# Patient Record
Sex: FEMALE | Race: WHITE
[De-identification: names, ages, dates, MRNs, and addresses within clinical notes are randomized per-mention and may not be internally consistent; named-entity substitution may affect disease eponyms.]

---

## 2021-10-19 ENCOUNTER — HOSPITAL ENCOUNTER (EMERGENCY)
Dept: HOSPITAL 47 - EC | Age: 21
Discharge: HOME | End: 2021-10-19
Payer: COMMERCIAL

## 2021-10-19 VITALS
DIASTOLIC BLOOD PRESSURE: 62 MMHG | TEMPERATURE: 97.7 F | HEART RATE: 64 BPM | SYSTOLIC BLOOD PRESSURE: 107 MMHG | RESPIRATION RATE: 18 BRPM

## 2021-10-19 DIAGNOSIS — Z91.040: ICD-10-CM

## 2021-10-19 DIAGNOSIS — K62.5: Primary | ICD-10-CM

## 2021-10-19 DIAGNOSIS — E11.9: ICD-10-CM

## 2021-10-19 DIAGNOSIS — F17.290: ICD-10-CM

## 2021-10-19 DIAGNOSIS — I10: ICD-10-CM

## 2021-10-19 LAB
BASOPHILS # BLD AUTO: 0 K/UL (ref 0–0.2)
BASOPHILS NFR BLD AUTO: 0 %
EOSINOPHIL # BLD AUTO: 0.2 K/UL (ref 0–0.7)
EOSINOPHIL NFR BLD AUTO: 2 %
ERYTHROCYTE [DISTWIDTH] IN BLOOD BY AUTOMATED COUNT: 5.09 M/UL (ref 3.8–5.4)
ERYTHROCYTE [DISTWIDTH] IN BLOOD: 15.7 % (ref 11.5–15.5)
HCT VFR BLD AUTO: 43.6 % (ref 34–46)
HGB BLD-MCNC: 13.6 GM/DL (ref 11.4–16)
LYMPHOCYTES # SPEC AUTO: 3.4 K/UL (ref 1–4.8)
LYMPHOCYTES NFR SPEC AUTO: 28 %
MCH RBC QN AUTO: 26.7 PG (ref 25–35)
MCHC RBC AUTO-ENTMCNC: 31.2 G/DL (ref 31–37)
MCV RBC AUTO: 85.6 FL (ref 80–100)
MONOCYTES # BLD AUTO: 0.6 K/UL (ref 0–1)
MONOCYTES NFR BLD AUTO: 5 %
NEUTROPHILS # BLD AUTO: 7.4 K/UL (ref 1.3–7.7)
NEUTROPHILS NFR BLD AUTO: 63 %
PLATELET # BLD AUTO: 320 K/UL (ref 150–450)
WBC # BLD AUTO: 11.9 K/UL (ref 3.8–10.6)

## 2021-10-19 PROCEDURE — 85025 COMPLETE CBC W/AUTO DIFF WBC: CPT

## 2021-10-19 PROCEDURE — 36415 COLL VENOUS BLD VENIPUNCTURE: CPT

## 2021-10-19 PROCEDURE — 99283 EMERGENCY DEPT VISIT LOW MDM: CPT

## 2021-10-19 NOTE — ED
General Adult HPI





- General


Chief complaint: GI Bleed


Stated complaint: blood in stool


Time Seen by Provider: 10/19/21 00:34


Source: patient


Mode of arrival: ambulatory





- History of Present Illness


Initial comments: 





21-year-old female with a past medical history of diabetes mellitus, 

hypertension, anemia presents to the emergency room for a chief complaint of 

rectal bleeding.  Patient states she was having vaginal and anal intercourse.  

Right afterwards she had a bowel movement and noticed there was bright red blood

in her bowel movement.  This concerned her as she has a history of anemia.  

Patient states she felt weaker and thought it could be because she was 

anemic.Patient has no other complaints at this time including shortness of 

breath, chest pain, abdominal pain, nausea or vomiting, headache, or visual 

changes.





- Related Data


                                    Allergies











Allergy/AdvReac Type Severity Reaction Status Date / Time


 


latex AdvReac  Rash/Hives Verified 10/19/21 00:31














Review of Systems


ROS Statement: 


Those systems with pertinent positive or pertinent negative responses have been 

documented in the HPI.





ROS Other: All systems not noted in ROS Statement are negative.





Past Medical History


Past Medical History: Diabetes Mellitus, Hypertension


Additional Past Medical History / Comment(s): anemia


History of Any Multi-Drug Resistant Organisms: None Reported


Additional Past Surgical History / Comment(s): thoracic/lumbar surgery


Past Psychological History: ADD/ADHD, Anxiety, Bipolar, Depression


Smoking Status: Current every day smoker, Vaper


Past Alcohol Use History: Occasional


Past Drug Use History: Marijuana





General Exam


General appearance: alert, in no apparent distress


Head exam: Present: atraumatic


Eye exam: Present: normal appearance, PERRL, EOMI.  Absent: scleral icterus, 

conjunctival injection


ENT exam: Present: normal exam, mucous membranes moist


Neck exam: Present: normal inspection, full ROM.  Absent: tenderness


Respiratory exam: Present: normal lung sounds bilaterally.  Absent: respiratory 

distress, wheezes


Cardiovascular Exam: Present: regular rate, normal rhythm, normal heart sounds


GI/Abdominal exam: Present: soft.  Absent: distended, tenderness


Rectal exam: Present: normal inspection (No bleeding noted.  Mellissa CRUZ present as

chaperone)





Course


                                   Vital Signs











  10/19/21





  00:23


 


Temperature 97.7 F


 


Pulse Rate 64


 


Respiratory 18





Rate 


 


Blood Pressure 107/62


 


O2 Sat by Pulse 98





Oximetry 














Medical Decision Making





- Medical Decision Making





Vitals are stable.  Patient is well-appearing.  HPI and physical exam as 

documented.  Patient very concerned she is anemic.  CBC was obtained which 

revealed a hemoglobin of 13.6.  At this time patient is not having active 

bleeding intermediate discharged home.





- Lab Data


Result diagrams: 


                                 10/19/21 01:15





                                   Lab Results











  10/19/21 Range/Units





  01:15 


 


WBC  11.9 H  (3.8-10.6)  k/uL


 


RBC  5.09  (3.80-5.40)  m/uL


 


Hgb  13.6  (11.4-16.0)  gm/dL


 


Hct  43.6  (34.0-46.0)  %


 


MCV  85.6  (80.0-100.0)  fL


 


MCH  26.7  (25.0-35.0)  pg


 


MCHC  31.2  (31.0-37.0)  g/dL


 


RDW  15.7 H  (11.5-15.5)  %


 


Plt Count  320  (150-450)  k/uL


 


MPV  7.3  


 


Neutrophils %  63  %


 


Lymphocytes %  28  %


 


Monocytes %  5  %


 


Eosinophils %  2  %


 


Basophils %  0  %


 


Neutrophils #  7.4  (1.3-7.7)  k/uL


 


Lymphocytes #  3.4  (1.0-4.8)  k/uL


 


Monocytes #  0.6  (0-1.0)  k/uL


 


Eosinophils #  0.2  (0-0.7)  k/uL


 


Basophils #  0.0  (0-0.2)  k/uL














Disposition


Clinical Impression: 


 Rectal bleeding





Disposition: HOME SELF-CARE


Condition: Good


Instructions (If sedation given, give patient instructions):  Gastrointestinal 

Bleeding (ED)


Additional Instructions: 


please follow up with your doctor in one to 2 days.  Return to the emergency 

room for any worsening symptoms.


Is patient prescribed a controlled substance at d/c from ED?: No


Referrals: 


Dami Figueroa MD [Primary Care Provider] - 1-2 days


Time of Disposition: 01:35

## 2022-02-10 NOTE — CONS
CONSULTATION



DATE OF SERVICE:

02/10/2022



This 21-year-old lady has been evaluated in [facility] for possible obstructive sleep

apnea, multiple movements during the night, and significant excessive daytime

sleepiness.



HISTORY OF PRESENT ILLNESS/SLEEP-WAKE EVALUATION:

Patient's usual sleep schedule is from 3 a.m. until 7 or 8 a.m. No problems with

falling asleep. Has TV set in bedroom. Usually sleeps on the side position. Positive

history of very loud snoring and witnessed episodes of stopped breathing during sleep.

Patient wakes up from sleep 4 times with nocturia. Positive history of grinding teeth,

dry mouth, panic attacks, heartburn, sleeptalking, twitching of her legs during the

night, possible out-of-dream movements during sleep.



Positive history of hypnagogic hallucinations.  No history of sleep paralysis or

cataplexy.



In the morning the patient wakes up tired, has difficulties paying attention, falling

asleep during the day, worries about her sleep, has problems with memory,

concentration, irritability, depression and anxiety.  Star Sleepiness Scale is in

extremely high range at 20.  The patient drinks up to 4 caffeinated beverages during

the day and takes up to 2 naps a day, usually around noontime.



PAST MEDICAL HISTORY:

Positive for ADHD, hypertension, hyperlipidemia, acid reflux, diabetes mellitus,

bipolar disorder, anemia.



PAST SURGICAL HISTORY:

Thoracic fusion.



MEDICATIONS:

1. Concerta 30 mg once a day.

2. Abilify 10 mg once a day.

3. Lexapro 10 mg once a day.

4. Invokana 100 mg once a day.

5. Atorvastatin 10 mg once a day.

6. Glipizide 10 mg once a day.

7. Lisinopril 10 mg once a day.

8. Omeprazole 20 mg once a day.

9. Famotidine 20 mg once a day.



SOCIAL HISTORY:

Positive for using _____. Alcohol consumption rarely.



FAMILY HISTORY:

Hypertension, snoring, arthritis, diabetes, mental illness.



REVIEW OF SYSTEMS:

Multiple awakenings from sleep, significant amount of movements during sleep,

significant excessive daytime sleepiness.



No fevers.  No double vision.  No recent chest pain.  No shortness of breath.  No

abdominal pain.  No bleeding episodes.  No blood in the urine.  No seizure episodes.



PHYSICAL EXAMINATION:

GENERAL: Pleasant  lady without distress.

VITAL SIGNS: /89, HR 91, RR 20, height 5 feet 5 inches, weight 358.8 pounds, body

mass index 59.5, temperature 97.5, oxygen saturation at room air 96%.

HEENT: BAUTISTA ESPINALMI, evaluation of oropharynx showed tongue protrudes midline. Low

position of soft palate; Mallampati II to III. Wide pillars. Small oropharyngeal air

space.

NECK:  Supple, no JVD.  Thyroid is not palpable. Extremely wide neck; 18-1/2 inches in

circumference.

LUNGS:  Clear to percussion and to auscultation.  Good air exchange.  No wheezing or

rhonchi.

HEART:  S1, S2 regular.  No murmurs, gallops, or rubs.

ABDOMEN:  Obese.

EXTREMITIES: No clubbing or cyanosis.

CNS:  Awake, alert, and oriented X3.  Cranial nerves 2 to 7 intact.  There is no

fasciculation or atrophy. noted.  No focal deficits observed.



IMPRESSION:

1. Loud snoring, witnessed episodes of stopped breathing during sleep, multiple

    awakenings from sleep, small oropharyngeal air space, wide neck, 18-1/2 inches in

    circumference, significant sleepiness by Star Sleepiness Scale; obstructive

    sleep apnea-hypopnea syndrome. Possible obesity hypoventilation.

2. Possibly out-of-dream movements during sleep; REM sleep behavioral disorder.

3. Restless leg symptoms.

4. Morbid obesity; BMI 59.5.

5. History of attention deficit hyperactivity disorder.

6. History of restless leg symptoms and possibly periodic limb movements.

7. Significant sleepiness with Star Sleepiness Scale in extremely high range at 20,

    dictating necessity to include hypersomnia and narcolepsy in differential

    diagnosis.  The patient also possibly has hypnagogic hallucinations.

8. Hypertension.

9. Hyperlipidemia.

10.Acid reflux.

11.Bipolar.

12.Diabetes mellitus.

13.History of iron deficiency anemia.



PLAN:

1. Polysomnography for evaluation of patient's breathing during sleep, to check for

    leg movements and possibly out-of-dream movements during the night.

2. CPAP/BiPAP titration if sleep study confirms obstructive sleep apnea-hypopnea

    syndrome.

3. Preferable position during sleep on the side.

4. No driving if patient feels any sleepiness.

5. I will see patient for follow up visit to explain results of testing and following

    plan.



Thank you very much for referring this patient for consultation.



Sincerely,







Eloy Mahoney MD, PhD, FAASM

Diplomat of [employer]

Sleep Medicine Board of [employer] of [employer]

Medical Director of [facility]





NIKKI / CONNOR: [id number] / Job#: [id number]

## 2022-03-14 NOTE — FL
ESOPHOGRAM.

 

HISTORY: Dysphagia

 

Esophagram was performed per the air contrast technique.  The patient swallowed barium and effervesce
nt crystals without difficulty or delay. 

 

 Esophageal peristalsis and motility appear to be within normal limits.  

 

There is no evidence for filling defect, mass or diverticulum.  

 

No hiatal hernia seen.  

 

Subsequently single contrast cervical esophagram was performed which fails demonstrate evidence for a
spiration penetration or mass. 

 

IMPRESSION: Unremarkable study.

## 2025-03-17 NOTE — P.PCN
Date of Procedure: 03/17/25


Preoperative Diagnosis: 


Hiatal hernia


Postoperative Diagnosis: 


Duodenal ulcer


Hiatal hernia


Procedure(s) Performed: 


EGD with biopsy


Anesthesia: ANDRES


Surgeon: Jesse Mae


Pathology: other (Biopsy of duodenal ulcer, antrum, GE junction)


Condition: stable


Disposition: same day


Indications for Procedure: 


25-year-old female with continued symptoms of GERD.  She is supposed to be 

taking Protonix but has not been able to fill the prescription recently.  She 

has been diagnosed with hiatal hernia in the past.


Operative Findings: 


Duodenal ulcer


Hiatal hernia


Description of Procedure: 


The patient was brought into the endoscopy suite and placed in left lateral 

decubitus position.  Adequate sedation was achieved using conscious sedation.  A

bite-block was placed and an endoscope was placed in the oropharynx and advanced

under endoscopic visualization.  The endoscope was advanced through the 

esophagus into the stomach, through the gastric antrum and in through the 

pylorus.  The third portion of duodenum was visualized.  The endoscope was then 

slowly withdrawn.  The first portion of duodenum was noted to have a small 

duodenal ulcer.  This was biopsied.  He was stasis was maintained.  The antrum 

was noted to have mild inflammatory changes.  Biopsies were taken.  The gastric 

body distended normally and the gastric folds appeared normal and flattened with

insufflation.  A retroflexed view of the fundus and GE junction revealed mild 

hiatal hernia.  GE junction appeared mildly inflamed and biopsies were taken.  

The esophagus appeared endoscopically normal.  Excess air was removed and the 

scope was withdrawn and the procedure was completed. The patient was sent to 

PACU in stable condition.